# Patient Record
Sex: FEMALE | Race: WHITE | NOT HISPANIC OR LATINO | Employment: PART TIME | URBAN - METROPOLITAN AREA
[De-identification: names, ages, dates, MRNs, and addresses within clinical notes are randomized per-mention and may not be internally consistent; named-entity substitution may affect disease eponyms.]

---

## 2018-10-30 ENCOUNTER — OFFICE VISIT (OUTPATIENT)
Dept: URGENT CARE | Facility: URGENT CARE | Age: 63
End: 2018-10-30
Payer: MEDICARE

## 2018-10-30 ENCOUNTER — APPOINTMENT (OUTPATIENT)
Dept: ULTRASOUND IMAGING | Facility: CLINIC | Age: 63
End: 2018-10-30
Attending: EMERGENCY MEDICINE
Payer: MEDICARE

## 2018-10-30 ENCOUNTER — HOSPITAL ENCOUNTER (EMERGENCY)
Facility: CLINIC | Age: 63
Discharge: HOME | End: 2018-10-30
Attending: EMERGENCY MEDICINE | Admitting: EMERGENCY MEDICINE
Payer: MEDICARE

## 2018-10-30 VITALS
DIASTOLIC BLOOD PRESSURE: 76 MMHG | OXYGEN SATURATION: 100 % | BODY MASS INDEX: 28.51 KG/M2 | RESPIRATION RATE: 20 BRPM | SYSTOLIC BLOOD PRESSURE: 171 MMHG | TEMPERATURE: 98.4 F | HEIGHT: 61 IN | WEIGHT: 151 LBS

## 2018-10-30 VITALS
DIASTOLIC BLOOD PRESSURE: 80 MMHG | HEART RATE: 73 BPM | OXYGEN SATURATION: 99 % | SYSTOLIC BLOOD PRESSURE: 145 MMHG | RESPIRATION RATE: 12 BRPM | WEIGHT: 152.9 LBS | TEMPERATURE: 98.6 F

## 2018-10-30 DIAGNOSIS — I82.812 ACUTE SUPERFICIAL VENOUS THROMBOSIS OF LOWER EXTREMITY, LEFT: ICD-10-CM

## 2018-10-30 DIAGNOSIS — M79.652 PAIN OF LEFT THIGH: Primary | ICD-10-CM

## 2018-10-30 PROCEDURE — 93971 EXTREMITY STUDY: CPT | Mod: LT

## 2018-10-30 PROCEDURE — 99203 OFFICE O/P NEW LOW 30 MIN: CPT | Performed by: PHYSICIAN ASSISTANT

## 2018-10-30 PROCEDURE — 99284 EMERGENCY DEPT VISIT MOD MDM: CPT | Mod: 25

## 2018-10-30 ASSESSMENT — ENCOUNTER SYMPTOMS: SHORTNESS OF BREATH: 0

## 2018-10-30 NOTE — ED PROVIDER NOTES
"  History     Chief Complaint:  Leg Pain     History limited due to language barrier. History translated by in person .    SUE Anne is a 63 year old female who presents to the emergency department today for evaluation of left leg pain. The patient reports that three weeks ago she was evaluated at [facility] for left leg pain, at which time she was diagnosed with DVT below the knee. She was advised compression stockings and discharged home. Today the patient presents with increasing pain to her left leg. The patient denies chest pain and shortness of breath.     Allergies:  No Known Drug Allergies     Medications:    Albuterol  Levothyroxine sodium  Lovastatin    Past Medical History:    Deaf  Hypertension  DVT    Past Surgical History:    Surgical history reviewed. No pertinent surgical history.    Family History:    Family history reviewed. No pertinent family history.    Social History:  Smoking Status: Current Every Day Smoker   Packs/day: 0.3   Years: 20   Types: Cigarettes  Smokeless Tobacco: Never Used  Marital Status:        Review of Systems   Respiratory: Negative for shortness of breath.    Cardiovascular: Negative for chest pain.   Musculoskeletal:        Left leg pain   All other systems reviewed and are negative.    Physical Exam     Patient Vitals for the past 24 hrs:   BP Temp Temp src Heart Rate Resp SpO2 Height Weight   10/30/18 1546 171/76 98.4  F (36.9  C) Oral 84 20 100 % 1.549 m (5' 1\") 68.5 kg (151 lb)     Physical Exam  Nursing note and vitals reviewed.  Constitutional:   Awake alert  HENT:   Pharynx normal, tms normal, atraumatic  Mouth/Throat:  Oropharynx is clear and moist.   Eyes:    Conjunctivae normal and EOM are normal. Pupils are equal, round, and reactive to light.   Neck:    Normal range of motion. Neck supple. No tracheal deviation present.   Cardiovascular: Normal rate, regular rhythm, normal heart sounds and intact distal pulses.    Pulmonary/Chest: "  Effort normal and breath sounds normal. No respiratory distress. No wheezes. No rales. No tenderness.   Abdominal:   Soft. The patient exhibits no mass. There is no tenderness. There is no rebound and no guarding.   Musculoskeletal:  Normal range of motion. No edema and no tenderness. Left medial thigh with palpable cord and surrounding erythema. Warmth and swelling to the area.  Lymphadenopathy:  No cervical adenopathy.   Neurological:  Alert and oriented to person, place, and time. No cranial nerve deficit. Normal muscle tone.           GCS 15   Skin:    Skin is warm and dry.   Psychiatric:   Normal mood and affect.     Emergency Department Course     Imaging:  Radiology findings were communicated with the patient who voiced understanding of the findings.    US Lower Extremity Venous Duplex Left  1. No DVT.  2. Superficial thrombus in the greater saphenous vein.  Reading per radiology    Emergency Department Course:    1617 Nursing notes and vitals reviewed.    1626 I performed an exam of the patient as documented above.     1709 The patient was sent for a lower extremity ultrasound while in the emergency department, results above.      I personally reviewed the imaging results with the patient and answered all related questions prior to discharge.    Impression & Plan      Medical Decision Making:  Tonie Anne is a 63 year old female who presents to the emergency department today for evaluation of pain redness and swelling left medial thigh.  Patient was apparently at some outside clinic 3 weeks ago has some superficial throat thrombophlebitis in her lower leg below the knee.  This was treated with compression stocking.  Over the last week she has had some redness and pain and swelling in the medial left thigh.  She was seen at outside urgent care and sent here.  She has superficial thrombosis in the greater saphenous vein from knee to groin on ultrasound.  No chest pain or shortness of breath consistent  with pulmonary embolism.  Will treat with Xarelto 10 mg daily for 45 days per up-to-date.  Heat to the area rest nonsteroidals and mandatory recheck with her clinic in the next 2-3 days.  Return to the ED immediately should develops any chest pain shortness of breath lightheadedness dizziness or other complaint.    Diagnosis:    ICD-10-CM    1. Acute superficial venous thrombosis of lower extremity, left I82.812      Disposition:   The patient is discharged to home.    Discharge Medications:  New Prescriptions    RIVAROXABAN ANTICOAGULANT (XARELTO) 10 MG TABS TABLET    Take 1 tablet (10 mg) by mouth daily (with dinner)     Scribe Disclosure:  Jennyfer BAUER, am serving as a scribe at 4:32 PM on 10/30/2018 to document services personally performed by Shiraz Jenkins MD based on my observations and the provider's statements to me.     EMERGENCY DEPARTMENT       Shiraz Jenkins MD  10/30/18 7232

## 2018-10-30 NOTE — MR AVS SNAPSHOT
"              After Visit Summary   10/30/2018    Tonie Anne    MRN: [id number]           Patient Information     Date Of Birth          1955        Visit Information        Provider Department      10/30/2018 1:20 PM Ania Abreu PA-C [facility]        Today's Diagnoses     Pain of left thigh    -  1       Follow-ups after your visit        Who to contact     If you have questions or need follow up information about today's clinic visit or your schedule please contact [facility] directly at [phone].  Normal or non-critical lab and imaging results will be communicated to you by MyChart, letter or phone within 4 business days after the clinic has received the results. If you do not hear from us within 7 days, please contact the clinic through [employer] or phone. If you have a critical or abnormal lab result, we will notify you by phone as soon as possible.  Submit refill requests through [employer] or call your pharmacy and they will forward the refill request to us. Please allow 3 business days for your refill to be completed.          Additional Information About Your Visit        MyChart Information     [employer]ore lets you send messages to your doctor, view your test results, renew your prescriptions, schedule appointments and more. To sign up, go to [email or web address] . Click on \"Log in\" on the left side of the screen, which will take you to the Welcome page. Then click on \"Sign up Now\" on the right side of the page.     You will be asked to enter the access code listed below, as well as some personal information. Please follow the directions to create your username and password.     Your access code is: [email or web address]  Expires: 2019  3:17 PM     Your access code will  in 90 days. If you need help or a new code, please call your [facility] clinic or [phone].        Care EveryWhere ID     This is your Care EveryWhere ID. " This could be used by other organizations to access your [facility] medical records  [id number]        Your Vitals Were     Pulse Temperature Respirations Pulse Oximetry          73 98.6  F (37  C) (Oral) 12 99%         Blood Pressure from Last 3 Encounters:   10/30/18 145/80   10/03/13 120/78   07/26/10 160/92    Weight from Last 3 Encounters:   10/30/18 152 lb 14.4 oz (69.4 kg)   07/26/10 167 lb 14.4 oz (76.2 kg)   07/21/10 169 lb 12.8 oz (77 kg)              Today, you had the following     No orders found for display       Primary Care Provider Fax #    Physician No Ref-Primary [phone]       No address on file        Equal Access to Services     YANI MARIA : Sinan Porter, waaxda luqadaha, qaybta kaalmada adekristoferyajosue, maryana cope . So [facility] [phone].    ATENCIÓN: Si habla español, tiene a peck disposición servicios gratuitos de asistencia lingüística. Llame al [phone].    We comply with applicable federal civil rights laws and [place] laws. We do not discriminate on the basis of race, color, national origin, age, disability, sex, sexual orientation, or gender identity.            Thank you!     Thank you for choosing [facility]  for your care. Our goal is always to provide you with excellent care. Hearing back from our patients is one way we can continue to improve our services. Please take a few minutes to complete the written survey that you may receive in the mail after your visit with us. Thank you!             Your Updated Medication List - Protect others around you: Learn how to safely use, store and throw away your medicines at www.disposemymeds.org.          This list is accurate as of 10/30/18  3:17 PM.  Always use your most recent med list.                   Brand Name Dispense Instructions for use Diagnosis    albuterol 108 (90 Base) MCG/ACT inhaler    PROAIR HFA/PROVENTIL HFA/VENTOLIN HFA    1 Inhaler    Inhale 2  puffs into the lungs every 6 hours as needed for shortness of breath / dyspnea    Wheezing       azithromycin 250 MG tablet    ZITHROMAX    6 tablet    2 tabs po qd day 1, then 1 tab po qd days 2-5    Acute bronchitis with symptoms > 10 days       LEVOTHYROXINE SODIUM PO      None Entered        LOVASTATIN PO      None Entered        * NO ACTIVE MEDICATIONS      .        * UNKNOWN TO PATIENT      BP med        * Notice:  This list has 2 medication(s) that are the same as other medications prescribed for you. Read the directions carefully, and ask your doctor or other care provider to review them with you.

## 2018-10-30 NOTE — PROGRESS NOTES
SUBJECTIVE:  Chief Complaint   Patient presents with     Pain     Pt states left thigh pain, swelling and red      Urgent Care     Tonie Anne is a 63 year old female presents with a chief complaint of left upper medial leg pain and swelling.  Onset one week ago, progressively worse in the past few days.  The area is becoming more swollen and red.     Patient was apparently seen in a clinic about 3 weeks ago and was diagnosed via ultrasound with a blood clot in the same leg, in the lower portion.  She states that she was treated with a compression sock.        SH: history is obtained by writing, as patient is unable to hear or read lips    No past medical history on file.     HTN  Prediabetes    There is no problem list on file for this patient.    Social History   Substance Use Topics     Smoking status: Current Every Day Smoker     Packs/day: 0.30     Years: 20.00     Types: Cigarettes     Smokeless tobacco: Never Used     Alcohol use Not on file       ROS:  CONSTITUTIONAL:NEGATIVE for fever, chills, change in weight  INTEGUMENTARY/SKIN: as per HPI  MUSCULOSKELETAL: as per HPI  NEURO: NEGATIVE for weakness, dizziness or paresthesias  Review of systems negative except as stated above.    EXAM:   /80  Pulse 73  Temp 98.6  F (37  C) (Oral)  Resp 12  Wt 152 lb 14.4 oz (69.4 kg)  SpO2 99%  Gen: healthy,alert,no distress  Extremity: left upper leg: erythematous, tender, cord like serpiginous mass from mid medial left upper leg to near left inguinal region.      (M79.652) Pain of left thigh  (primary encounter diagnosis)  Comment: given history and current exam, concerned for thrombus.     Plan: to ED now for further evaluation.  Patient will have her roommate drive her.      Patient expresses understanding and agreement with the assessment and plan as above.

## 2018-10-30 NOTE — ED AVS SNAPSHOT
Emergency Department    [address] [facility] [address]    Phone:  [phone]    Fax:  [phone]                                       Tonie Anne   MRN: [id number]    Department:   Emergency Department   Date of Visit:  10/30/2018           Patient Information     Date Of Birth          1955        Your diagnoses for this visit were:     Acute superficial venous thrombosis of lower extremity, left        You were seen by Shiraz Jenkins MD.      Follow-up Information     Follow up with [facility]. Schedule an appointment as soon as possible for a visit in 3 days.    Contact information:    [address] [address]  [phone]          Discharge Instructions         Understanding Venous Thromboembolism   Venous thromboembolism is when a blood clot forms in a vein. The term refers to two linked conditions: deep vein thrombosis (DVT) and pulmonary embolism. If you have DVT, a blood clot has formed in a deep vein. It often happens in a leg. Sometimes this clot can break free and travel to your lungs. Once there, it can block blood flow. This is called a pulmonary embolism. It is a health emergency.     How to say it  VEE-[facility] [id number]-lida-uhm   What causes venous thromboembolism?   A blood clot can form in a vein for many reasons. The cause may be partly your genes. For example, you may have a protein deficiency or a blood-clotting disorder. Your family history may also make you more prone to the problem.   Other things can also raise your chance for a blood clot in a vein. These include:    Major surgery, such as a joint replacement    Injury, such as a broken leg or damaged spinal cord    Cancer    Heart failure    Older age    Obesity    Smoking    Some medicines, such as birth control pills    Pregnancy    Long periods of not moving enough, such as after a surgery or on a long flight   Symptoms of venous thromboembolism   You may have  no symptoms. But if you do, they can start quickly. Symptoms of DVT are:    Redness near the vein    Swelling    Pain    Changes in the color of the skin  A pulmonary embolism is a health emergency. It can cause trouble breathing, chest pain, coughing up of blood, and an irregular heartbeat. It may also cause sudden death.  Treatment for venous thromboembolism   The goal of treatment is to break up any blood clots and stop new ones from forming. A pulmonary embolism is then less likely to happen. Treatment includes:     Blood thinners. These medicines may be given as a shot, through an IV, or in pill form. You may need to take them for a few months or longer. It depends on the cause of the blood clot.      Walking. Once your symptoms are under control,your healthcare provider may recommend that you walk. You may not be able to walk too far at first, such as after a surgery. But it will stop more blood clots from forming and help you feel better.    Compression socks. These socks or other similar devices can lower your risk for blood clots. They gently put pressure on the lower leg.    Inferior vena cava filter. This small metal deviceis put into your inferior vena cava. That s the main vein that runs from your legs to your heart and lungs. It catches large clots before they reach these organs. It can stop a pulmonary embolism. You may need this treatment if you aren t able to take blood thinners.    Surgery. In some cases, you may need surgery to remove a clot, especially if it has already reached the lungs.    Possible complications of venous thromboembolism    New clots    High blood pressure in the arteries to the lungs (pulmonary hypertension)    Weak valves in a vein (post-thrombotic syndrome). This can cause cramping, pain, and swelling.    Bleeding    Death  When to call your healthcare provider   Call your healthcare provider right away if you have any of these:    Fever of 100.4 F (38 C) or higher, or as  directed by your healthcare provider    Redness, swelling, or fluid leaking from your incision that gets worse    Pain that gets worse    Symptoms that don t get better, or get worse    New symptoms   Date Last Reviewed: 10/1/2017    1195-2503 [employer]. [address] [place] [address], [place] [address]. All rights reserved. This information is not intended as a substitute for professional medical care. Always follow your healthcare professional's instructions.          24 Hour Appointment Hotline       To make an appointment at any [facility], call [phone] ([phone]). If you don't have a family doctor or clinic, we will help you find one. [facility] clinics are conveniently located to serve the needs of you and your family.             Review of your medicines      START taking        Dose / Directions Last dose taken    rivaroxaban ANTICOAGULANT 10 MG Tabs tablet   Commonly known as:  XARELTO   Dose:  10 mg   Quantity:  45 tablet        Take 1 tablet (10 mg) by mouth daily (with dinner)   Refills:  0          Our records show that you are taking the medicines listed below. If these are incorrect, please call your family doctor or clinic.        Dose / Directions Last dose taken    albuterol 108 (90 Base) MCG/ACT inhaler   Commonly known as:  PROAIR HFA/PROVENTIL HFA/VENTOLIN HFA   Dose:  2 puff   Quantity:  1 Inhaler        Inhale 2 puffs into the lungs every 6 hours as needed for shortness of breath / dyspnea   Refills:  1        azithromycin 250 MG tablet   Commonly known as:  ZITHROMAX   Quantity:  6 tablet        2 tabs po qd day 1, then 1 tab po qd days 2-5   Refills:  0        LEVOTHYROXINE SODIUM PO        None Entered   Refills:  0        LOVASTATIN PO        None Entered   Refills:  0          ASK your doctor about these medications        Dose / Directions Last dose taken    * NO ACTIVE MEDICATIONS   Ask about: Which instructions should I use?        .   Refills:  0        *  UNKNOWN TO PATIENT   Ask about: Which instructions should I use?        BP med   Refills:  0        * Notice:  This list has 2 medication(s) that are the same as other medications prescribed for you. Read the directions carefully, and ask your doctor or other care provider to review them with you.            Prescriptions were sent or printed at these locations (1 Prescription)                   Other Prescriptions                Printed at Department/Unit printer (1 of 1)         rivaroxaban ANTICOAGULANT (XARELTO) 10 MG TABS tablet                Procedures and tests performed during your visit     US Lower Extremity Venous Duplex Left      Orders Needing Specimen Collection     None      Pending Results     Date and Time Order Name Status Description    10/30/2018 1618 US Lower Extremity Venous Duplex Left Preliminary             Pending Culture Results     No orders found from 10/28/2018 to 10/31/2018.            Pending Results Instructions     If you had any lab results that were not finalized at the time of your Discharge, you can call the ED Lab Result RN at [phone]. You will be contacted by this team for any positive Lab results or changes in treatment. The nurses are available 7 days a week from 10A to 6:30P.  You can leave a message 24 hours per day and they will return your call.        Test Results From Your Hospital Stay        10/30/2018  5:20 PM      Narrative     ULTRASOUND LOWER EXTREMITY VENOUS DUPLEX LEFT   10/30/2018 5:13 PM     HISTORY: Left thigh pain and redness.    COMPARISON: None.    FINDINGS: Gray-scale, color and Doppler spectral analysis ultrasound  was performed of the left leg. Compression and augmentation imaging  was performed.    There is no evidence for deep venous thrombosis. The veins compress  and augment normally. There is superficial thrombus in the greater  saphenous vein from proximal thigh through the knee.        Impression     IMPRESSION:   1. No DVT.  2.  Superficial thrombus in the greater saphenous vein.                Clinical Quality Measure: Blood Pressure Screening     Your blood pressure was checked while you were in the emergency department today. The last reading we obtained was  BP: 171/76 . Please read the guidelines below about what these numbers mean and what you should do about them.  If your systolic blood pressure (the top number) is less than 120 and your diastolic blood pressure (the bottom number) is less than 80, then your blood pressure is normal. There is nothing more that you need to do about it.  If your systolic blood pressure (the top number) is 120-139 or your diastolic blood pressure (the bottom number) is 80-89, your blood pressure may be higher than it should be. You should have your blood pressure rechecked within a year by a primary care provider.  If your systolic blood pressure (the top number) is 140 or greater or your diastolic blood pressure (the bottom number) is 90 or greater, you may have high blood pressure. High blood pressure is treatable, but if left untreated over time it can put you at risk for heart attack, stroke, or kidney failure. You should have your blood pressure rechecked by a primary care provider within the next 4 weeks.  If your provider in the emergency department today gave you specific instructions to follow-up with your doctor or provider even sooner than that, you should follow that instruction and not wait for up to 4 weeks for your follow-up visit.        Thank you for choosing [facility]       Thank you for choosing [facility] for your care. Our goal is always to provide you with excellent care. Hearing back from our patients is one way we can continue to improve our services. Please take a few minutes to complete the written survey that you may receive in the mail after you visit with us. Thank you!        [employer] Information     [employer] - Smartphone RFID lets you send messages to your doctor, view your test results, renew  "your prescriptions, schedule appointments and more. To sign up, go to [email or web address] . Click on \"Log in\" on the left side of the screen, which will take you to the Welcome page. Then click on \"Sign up Now\" on the right side of the page.     You will be asked to enter the access code listed below, as well as some personal information. Please follow the directions to create your username and password.     Your access code is: [email or web address]  Expires: 2019  3:17 PM     Your access code will  in 90 days. If you need help or a new code, please call your [facility] clinic or [phone].        Care EveryWhere ID     This is your Care EveryWhere ID. This could be used by other organizations to access your [facility] medical records  [id number]        Equal Access to Services     YANI MARIA : Sinan Porter, emilia lara, ajnusz good, maryana cope . So [facility] [phone].    ATENCIÓN: Si habla español, tiene a peck disposición servicios gratuitos de asistencia lingüística. Llame al [phone].    We comply with applicable federal civil rights laws and [place] laws. We do not discriminate on the basis of race, color, national origin, age, disability, sex, sexual orientation, or gender identity.            After Visit Summary       This is your record. Keep this with you and show to your community pharmacist(s) and doctor(s) at your next visit.                  "

## 2018-10-30 NOTE — ED AVS SNAPSHOT
Emergency Department    [address] [place] [address]    Phone:  [phone]    Fax:  [phone]                                       Tonie Anne   MRN: [id number]    Department:   Emergency Department   Date of Visit:  10/30/2018           After Visit Summary Signature Page     I have received my discharge instructions, and my questions have been answered. I have discussed any challenges I see with this plan with the nurse or doctor.    ..........................................................................................................................................  Patient/Patient Representative Signature      ..........................................................................................................................................  Patient Representative Print Name and Relationship to Patient    ..................................................               ................................................  Date                                   Time    ..........................................................................................................................................  Reviewed by Signature/Title    ...................................................              ..............................................  Date                                               Time          22EPIC Rev 08/18

## 2018-10-30 NOTE — DISCHARGE INSTRUCTIONS
Understanding Venous Thromboembolism   Venous thromboembolism is when a blood clot forms in a vein. The term refers to two linked conditions: deep vein thrombosis (DVT) and pulmonary embolism. If you have DVT, a blood clot has formed in a deep vein. It often happens in a leg. Sometimes this clot can break free and travel to your lungs. Once there, it can block blood flow. This is called a pulmonary embolism. It is a health emergency.     How to say it  [facility]   What causes venous thromboembolism?   A blood clot can form in a vein for many reasons. The cause may be partly your genes. For example, you may have a protein deficiency or a blood-clotting disorder. Your family history may also make you more prone to the problem.   Other things can also raise your chance for a blood clot in a vein. These include:    Major surgery, such as a joint replacement    Injury, such as a broken leg or damaged spinal cord    Cancer    Heart failure    Older age    Obesity    Smoking    Some medicines, such as birth control pills    Pregnancy    Long periods of not moving enough, such as after a surgery or on a long flight   Symptoms of venous thromboembolism   You may have no symptoms. But if you do, they can start quickly. Symptoms of DVT are:    Redness near the vein    Swelling    Pain    Changes in the color of the skin  A pulmonary embolism is a health emergency. It can cause trouble breathing, chest pain, coughing up of blood, and an irregular heartbeat. It may also cause sudden death.  Treatment for venous thromboembolism   The goal of treatment is to break up any blood clots and stop new ones from forming. A pulmonary embolism is then less likely to happen. Treatment includes:     Blood thinners. These medicines may be given as a shot, through an IV, or in pill form. You may need to take them for a few months or longer. It depends on the cause of the blood clot.      Walking. Once your symptoms  are under control,your healthcare provider may recommend that you walk. You may not be able to walk too far at first, such as after a surgery. But it will stop more blood clots from forming and help you feel better.    Compression socks. These socks or other similar devices can lower your risk for blood clots. They gently put pressure on the lower leg.    Inferior vena cava filter. This small metal deviceis put into your inferior vena cava. That s the main vein that runs from your legs to your heart and lungs. It catches large clots before they reach these organs. It can stop a pulmonary embolism. You may need this treatment if you aren t able to take blood thinners.    Surgery. In some cases, you may need surgery to remove a clot, especially if it has already reached the lungs.    Possible complications of venous thromboembolism    New clots    High blood pressure in the arteries to the lungs (pulmonary hypertension)    Weak valves in a vein (post-thrombotic syndrome). This can cause cramping, pain, and swelling.    Bleeding    Death  When to call your healthcare provider   Call your healthcare provider right away if you have any of these:    Fever of 100.4 F (38 C) or higher, or as directed by your healthcare provider    Redness, swelling, or fluid leaking from your incision that gets worse    Pain that gets worse    Symptoms that don t get better, or get worse    New symptoms   Date Last Reviewed: 10/1/2017    [id number] [email or web address]. [address] [place] [address], [place] [address]. All rights reserved. This information is not intended as a substitute for professional medical care. Always follow your healthcare professional's instructions.

## 2023-05-02 ENCOUNTER — HOSPITAL ENCOUNTER (EMERGENCY)
Facility: CLINIC | Age: 68
Discharge: HOME | End: 2023-05-02
Attending: EMERGENCY MEDICINE | Admitting: EMERGENCY MEDICINE
Payer: COMMERCIAL

## 2023-05-02 ENCOUNTER — APPOINTMENT (OUTPATIENT)
Dept: ULTRASOUND IMAGING | Facility: CLINIC | Age: 68
End: 2023-05-02
Attending: EMERGENCY MEDICINE
Payer: COMMERCIAL

## 2023-05-02 VITALS
DIASTOLIC BLOOD PRESSURE: 70 MMHG | TEMPERATURE: 97.8 F | SYSTOLIC BLOOD PRESSURE: 146 MMHG | RESPIRATION RATE: 16 BRPM | HEART RATE: 84 BPM | OXYGEN SATURATION: 98 %

## 2023-05-02 DIAGNOSIS — I80.02 SUPERFICIAL THROMBOPHLEBITIS OF LEFT LEG: ICD-10-CM

## 2023-05-02 PROCEDURE — 93971 EXTREMITY STUDY: CPT | Mod: LT

## 2023-05-02 PROCEDURE — 99284 EMERGENCY DEPT VISIT MOD MDM: CPT | Mod: 25

## 2023-05-02 RX ORDER — CEPHALEXIN 500 MG/1
500 CAPSULE ORAL 4 TIMES DAILY
Qty: 28 CAPSULE | Refills: 0 | Status: SHIPPED | OUTPATIENT
Start: 2023-05-02 | End: 2023-05-09

## 2023-05-03 ASSESSMENT — ENCOUNTER SYMPTOMS
PALPITATIONS: 0
SHORTNESS OF BREATH: 0
CHEST TIGHTNESS: 0
CHILLS: 0
FEVER: 0

## 2023-05-03 NOTE — ED TRIAGE NOTES
Pt needs . C/o left leg swelling and pain x3 weeks - worse with standing. Denies cp or sob.      Triage Assessment     Row Name 05/02/23 1922       Respiratory WDL    Respiratory WDL WDL       Cardiac WDL    Cardiac WDL WDL       Cognitive/Neuro/Behavioral WDL    Cognitive/Neuro/Behavioral WDL WDL

## 2023-05-03 NOTE — DISCHARGE INSTRUCTIONS
Continue taking your Aspirin daily    Keep your ankle elevated as much as possible.    Use a heating pad to your leg    Follow-up with your primary care doctor in clinic in 2 days for recheck.

## 2023-05-03 NOTE — ED PROVIDER NOTES
History     Chief Complaint:  Leg Swelling       The patient requires  who provided interpretation during her ED course due to being deaf.    Tonie Anne is a 67 year old female with a past history of DVT who is currently on 81 mg aspirin daily with a history of diabetes and hypertension who presents with left leg swelling. Patient reports she noticed the swelling on her leg 3 weeks ago and since then it has worsened with some redness of her lower leg.  She denies fevers or chills, chest pain, shortness of breath, trauma, numbness.    Independent Historian:   None - Patient Only    Review of External Notes: none    ROS:  Review of Systems   Constitutional: Negative for chills and fever.   Respiratory: Negative for chest tightness and shortness of breath.    Cardiovascular: Positive for leg swelling. Negative for chest pain and palpitations.   All other systems reviewed and are negative.      Allergies:  No Known Allergies     Medications:    Levothyroxine   Mevacor   Coreg   Norvasc    Past Medical History:    Deaf   Hypertension   DVT  Diabetes   Hypothyroidism  Breast cancer  Asthma   Hypercholesterolemia  Thrombophlebitis    Past Surgical History:    Breast Lumpectomy      Family History:    Cardiovascular Disease  Coronary Artery Disease  Hyperlipidemia  Hypertension   Seizure disorder  Anxiety  Cancer    Social History:  The patient presents alone.  Arrived by private vehicle.    PCP: No Ref-Primary, Physician     Physical Exam     Patient Vitals for the past 24 hrs:   BP Temp Pulse Resp SpO2   05/02/23 1921 (!) 181/70 97.8  F (36.6  C) 86 16 98 %        Physical Exam  Nursing note and vitals reviewed.  Constitutional:  Appears well-developed and well-nourished.   HENT:   Head:    Atraumatic.   Mouth/Throat:   Oropharynx is clear and moist. No oropharyngeal exudate.   Eyes:    Pupils are equal, round, and reactive to light.   Neck:    Normal range of motion. Neck supple.      No  tracheal deviation present. No thyromegaly present.   Cardiovascular:  Normal rate, regular rhythm, no murmur   Pulmonary/Chest: Breath sounds are clear and equal without wheezes or crackles.  Abdominal:   Soft. Bowel sounds are normal. Exhibits no distension and      no mass. There is no tenderness.      There is no rebound and no guarding.   Musculoskeletal:  Left leg exam: She has multiple superficial varicosities around the left ankle more prominent in the medial aspect of the left ankle.  There is some edema and minimal erythema and warmth to the distal aspect of the lower calf and ankle which spares the foot.  Good dorsalis pedis pulse.  Proximal calf is normal.  Lymphadenopathy:  No cervical adenopathy.   Neurological:   Alert and oriented to person, place, and time.   Skin:    Skin is warm and dry. No rash noted. No pallor.       Emergency Department Course     Imaging:  US Lower Extremity Venous Duplex Left   Final Result   IMPRESSION:   1.  No deep venous thrombosis in the left lower extremity.   2.  Superficial thrombus within a varicose vein in the medial ankle.         Report per radiology    Emergency Department Course & Assessments:       Interventions:  Medications - No data to display     Assessments:  2150 I obtained history and examined the patient as noted above.     Independent Interpretation (X-rays, CTs, rhythm strip):  None    Consultations/Discussion of Management or Tests:  None        Social Determinants of Health affecting care:   The patient is deaf    Disposition:  The patient was discharged to home.     Impression & Plan        Medical Decision Making:  This patient was for was seen and evaluated for concern of tenderness, pain, and erythema along the course of multiple prominent superficial varicosities on her left ankle/lower leg.  Her care is complicated by the fact that she is deaf.  Vitals were reviewed. On physical exam there was a palpable, tender cord like structure on the  medial aspect her left ankle with mild surrounding erythema. An  ultrasound was obtained which confirmed this is a superficial thrombophlebitis. There is no evidence of deep venous thrombosis. There is some significant erythema, warmth to require antibiotics.  There is no sign of cellulitis or lymphangitis.  No sign of necrotizing fasciitis or abscess.  I recommended warm compresses for 20 minutes 4-5 times per day.  She states that she cannot tolerate NSAIDs due to her chronic kidney disease and cannot take more than a baby aspirin per day otherwise she gets hives.  She was advised to follow up with her PCP within the next couple of days for recheck and she was told to return if symptoms continue despite outpatient recommendations. She was advised to return to the ED if she develops chest pain, SOB, worsened extremity symptoms, fever, or any other medical concerns. All questions and concerns were addressed prior to discharge.       Diagnosis:    ICD-10-CM    1. Superficial thrombophlebitis of left leg  I80.02            Discharge Medications:  New Prescriptions    No medications on file          Scribe Disclosure:  I, PRINCESS TYREE, am serving as a scribe at 9:53 PM on 5/2/2023 to document services personally performed by Hetal Pollack MD based on my observations and the provider's statements to me.     5/2/2023   Hetal Pollack MD [place], Cheri Lee, MD  05/03/23 0304       Hetal Pollack MD  05/03/23 0300